# Patient Record
Sex: MALE | Race: WHITE | NOT HISPANIC OR LATINO
[De-identification: names, ages, dates, MRNs, and addresses within clinical notes are randomized per-mention and may not be internally consistent; named-entity substitution may affect disease eponyms.]

---

## 2022-12-27 PROBLEM — Z00.00 ENCOUNTER FOR PREVENTIVE HEALTH EXAMINATION: Status: ACTIVE | Noted: 2022-12-27

## 2022-12-29 ENCOUNTER — FORM ENCOUNTER (OUTPATIENT)
Age: 77
End: 2022-12-29

## 2022-12-29 ENCOUNTER — APPOINTMENT (OUTPATIENT)
Dept: ORTHOPEDIC SURGERY | Facility: CLINIC | Age: 77
End: 2022-12-29
Payer: MEDICARE

## 2022-12-29 VITALS — HEIGHT: 70 IN | WEIGHT: 235 LBS | BODY MASS INDEX: 33.64 KG/M2

## 2022-12-29 PROCEDURE — 72100 X-RAY EXAM L-S SPINE 2/3 VWS: CPT

## 2022-12-29 PROCEDURE — 99213 OFFICE O/P EST LOW 20 MIN: CPT

## 2022-12-29 PROCEDURE — 72170 X-RAY EXAM OF PELVIS: CPT

## 2022-12-29 RX ORDER — METHOCARBAMOL 750 MG/1
750 TABLET, FILM COATED ORAL TWICE DAILY
Qty: 60 | Refills: 0 | Status: ACTIVE | COMMUNITY
Start: 2022-12-29 | End: 1900-01-01

## 2022-12-29 RX ORDER — METHYLPREDNISOLONE 4 MG/1
4 TABLET ORAL
Qty: 1 | Refills: 0 | Status: ACTIVE | COMMUNITY
Start: 2022-12-29 | End: 1900-01-01

## 2022-12-29 NOTE — ASSESSMENT
[FreeTextEntry1] : will start on medrol and robaxin and therapy. F/u 6 weeks if symptoms persist would get updated mri and f/u with pain mgt markus for possible repeat lesi. He is aware of his aorta, followed by his cardiologist

## 2022-12-29 NOTE — HISTORY OF PRESENT ILLNESS
[de-identified] : 12-29-22- Known lumbar stenosis in years past did well with Dr Chantelle bhagat. Over the last month lower back pain with left sided lower back pain with left leg radicular complaints have returned  [] : no [FreeTextEntry1] : lower back [FreeTextEntry3] : one month [FreeTextEntry5] : No injury. ongoing sciatica pain for a few years. patient had epidural a few years with relief. [de-identified] : dr aparicio

## 2022-12-29 NOTE — PHYSICAL EXAM
[Flexion] : flexion [Extension] : extension [Bending to left] : bending to left [Bending to right] : bending to right [] : light touch intact throughout both lower extremities [Facet arthropathy] : Facet arthropathy [Disc space narrowing] : Disc space narrowing [AP] : anteroposterior [There are no fractures, subluxations or dislocations. No significant abnormalities are seen] : There are no fractures, subluxations or dislocations. No significant abnormalities are seen [de-identified] : posterior left leg radicular complaints [FreeTextEntry1] : 3.8cm calcified abdominal aorta

## 2023-02-07 ENCOUNTER — APPOINTMENT (OUTPATIENT)
Dept: ORTHOPEDIC SURGERY | Facility: CLINIC | Age: 78
End: 2023-02-07
Payer: MEDICARE

## 2023-02-07 VITALS — WEIGHT: 235 LBS | BODY MASS INDEX: 33.64 KG/M2 | HEIGHT: 70 IN

## 2023-02-07 DIAGNOSIS — M51.36 OTHER INTERVERTEBRAL DISC DEGENERATION, LUMBAR REGION: ICD-10-CM

## 2023-02-07 DIAGNOSIS — M54.16 RADICULOPATHY, LUMBAR REGION: ICD-10-CM

## 2023-02-07 DIAGNOSIS — M48.061 SPINAL STENOSIS, LUMBAR REGION WITHOUT NEUROGENIC CLAUDICATION: ICD-10-CM

## 2023-02-07 PROCEDURE — 99213 OFFICE O/P EST LOW 20 MIN: CPT

## 2023-02-07 NOTE — HISTORY OF PRESENT ILLNESS
[de-identified] : 12-29-22- Known lumbar stenosis in years past did well with Dr Chantelle bhagat. Over the last month lower back pain with left sided lower back pain with left leg radicular complaints have returned  [] : no [FreeTextEntry1] : lower back [FreeTextEntry3] : one month [FreeTextEntry5] : No injury. ongoing sciatica pain for a few years. patient had epidural a few years with relief. [de-identified] : dr aparicio

## 2023-02-07 NOTE — PHYSICAL EXAM
[Facet arthropathy] : Facet arthropathy [Disc space narrowing] : Disc space narrowing [AP] : anteroposterior [There are no fractures, subluxations or dislocations. No significant abnormalities are seen] : There are no fractures, subluxations or dislocations. No significant abnormalities are seen [FreeTextEntry1] : 3.8cm calcified abdominal aorta [] : negative sitting straight leg raise [de-identified] : posterior left leg radicular complaints

## 2025-08-01 ENCOUNTER — APPOINTMENT (OUTPATIENT)
Dept: GASTROENTEROLOGY | Facility: CLINIC | Age: 80
End: 2025-08-01
Payer: MEDICARE

## 2025-08-01 DIAGNOSIS — K83.8 OTHER SPECIFIED DISEASES OF BILIARY TRACT: ICD-10-CM

## 2025-08-01 DIAGNOSIS — K86.2 CYST OF PANCREAS: ICD-10-CM

## 2025-08-01 PROCEDURE — 99203 OFFICE O/P NEW LOW 30 MIN: CPT | Mod: 93
